# Patient Record
Sex: MALE | Race: BLACK OR AFRICAN AMERICAN | NOT HISPANIC OR LATINO | URBAN - METROPOLITAN AREA
[De-identification: names, ages, dates, MRNs, and addresses within clinical notes are randomized per-mention and may not be internally consistent; named-entity substitution may affect disease eponyms.]

---

## 2024-05-30 ENCOUNTER — OFFICE VISIT (OUTPATIENT)
Dept: PRIMARY CARE | Facility: CLINIC | Age: 59
End: 2024-05-30
Payer: COMMERCIAL

## 2024-05-30 ENCOUNTER — LAB (OUTPATIENT)
Dept: LAB | Facility: LAB | Age: 59
End: 2024-05-30
Payer: COMMERCIAL

## 2024-05-30 VITALS
OXYGEN SATURATION: 98 % | DIASTOLIC BLOOD PRESSURE: 90 MMHG | HEART RATE: 72 BPM | SYSTOLIC BLOOD PRESSURE: 140 MMHG | WEIGHT: 164 LBS | BODY MASS INDEX: 25.71 KG/M2

## 2024-05-30 DIAGNOSIS — Z11.59 NEED FOR HEPATITIS C SCREENING TEST: ICD-10-CM

## 2024-05-30 DIAGNOSIS — E78.5 HYPERLIPIDEMIA, UNSPECIFIED HYPERLIPIDEMIA TYPE: ICD-10-CM

## 2024-05-30 DIAGNOSIS — Z00.00 ANNUAL PHYSICAL EXAM: Primary | ICD-10-CM

## 2024-05-30 DIAGNOSIS — R03.0 ELEVATED BP WITHOUT DIAGNOSIS OF HYPERTENSION: ICD-10-CM

## 2024-05-30 DIAGNOSIS — Z12.5 SCREENING PSA (PROSTATE SPECIFIC ANTIGEN): ICD-10-CM

## 2024-05-30 DIAGNOSIS — Z12.11 ENCOUNTER FOR SCREENING FOR MALIGNANT NEOPLASM OF COLON: ICD-10-CM

## 2024-05-30 DIAGNOSIS — Z00.00 ANNUAL PHYSICAL EXAM: ICD-10-CM

## 2024-05-30 PROBLEM — G47.33 OBSTRUCTIVE SLEEP APNEA: Status: ACTIVE | Noted: 2024-05-30

## 2024-05-30 PROBLEM — M48.02 SPINAL STENOSIS, CERVICAL REGION: Status: ACTIVE | Noted: 2024-05-30

## 2024-05-30 LAB
ALBUMIN SERPL BCP-MCNC: 4.2 G/DL (ref 3.4–5)
ALP SERPL-CCNC: 51 U/L (ref 33–120)
ALT SERPL W P-5'-P-CCNC: 16 U/L (ref 10–52)
ANION GAP SERPL CALC-SCNC: 11 MMOL/L (ref 10–20)
AST SERPL W P-5'-P-CCNC: 21 U/L (ref 9–39)
BILIRUB SERPL-MCNC: 0.7 MG/DL (ref 0–1.2)
BUN SERPL-MCNC: 15 MG/DL (ref 6–23)
CALCIUM SERPL-MCNC: 9.3 MG/DL (ref 8.6–10.6)
CHLORIDE SERPL-SCNC: 106 MMOL/L (ref 98–107)
CHOLEST SERPL-MCNC: 145 MG/DL (ref 0–199)
CHOLESTEROL/HDL RATIO: 3.5
CO2 SERPL-SCNC: 31 MMOL/L (ref 21–32)
CREAT SERPL-MCNC: 1.08 MG/DL (ref 0.5–1.3)
EGFRCR SERPLBLD CKD-EPI 2021: 79 ML/MIN/1.73M*2
ERYTHROCYTE [DISTWIDTH] IN BLOOD BY AUTOMATED COUNT: 11.8 % (ref 11.5–14.5)
EST. AVERAGE GLUCOSE BLD GHB EST-MCNC: 80 MG/DL
GLUCOSE SERPL-MCNC: 89 MG/DL (ref 74–99)
HBA1C MFR BLD: 4.4 %
HCT VFR BLD AUTO: 43.3 % (ref 41–52)
HCV AB SER QL: NONREACTIVE
HDLC SERPL-MCNC: 41.6 MG/DL
HGB BLD-MCNC: 13.6 G/DL (ref 13.5–17.5)
LDLC SERPL CALC-MCNC: 90 MG/DL
MCH RBC QN AUTO: 28 PG (ref 26–34)
MCHC RBC AUTO-ENTMCNC: 31.4 G/DL (ref 32–36)
MCV RBC AUTO: 89 FL (ref 80–100)
NON HDL CHOLESTEROL: 103 MG/DL (ref 0–149)
NRBC BLD-RTO: 0 /100 WBCS (ref 0–0)
PLATELET # BLD AUTO: 327 X10*3/UL (ref 150–450)
POTASSIUM SERPL-SCNC: 4.9 MMOL/L (ref 3.5–5.3)
PROT SERPL-MCNC: 7.2 G/DL (ref 6.4–8.2)
PSA SERPL-MCNC: 2.51 NG/ML
RBC # BLD AUTO: 4.86 X10*6/UL (ref 4.5–5.9)
SODIUM SERPL-SCNC: 143 MMOL/L (ref 136–145)
TRIGL SERPL-MCNC: 66 MG/DL (ref 0–149)
TSH SERPL-ACNC: 2.12 MIU/L (ref 0.44–3.98)
VLDL: 13 MG/DL (ref 0–40)
WBC # BLD AUTO: 3.9 X10*3/UL (ref 4.4–11.3)

## 2024-05-30 PROCEDURE — 82570 ASSAY OF URINE CREATININE: CPT

## 2024-05-30 PROCEDURE — 84153 ASSAY OF PSA TOTAL: CPT

## 2024-05-30 PROCEDURE — 84443 ASSAY THYROID STIM HORMONE: CPT

## 2024-05-30 PROCEDURE — 80053 COMPREHEN METABOLIC PANEL: CPT

## 2024-05-30 PROCEDURE — 1036F TOBACCO NON-USER: CPT | Performed by: INTERNAL MEDICINE

## 2024-05-30 PROCEDURE — 36415 COLL VENOUS BLD VENIPUNCTURE: CPT

## 2024-05-30 PROCEDURE — 86803 HEPATITIS C AB TEST: CPT

## 2024-05-30 PROCEDURE — 83036 HEMOGLOBIN GLYCOSYLATED A1C: CPT

## 2024-05-30 PROCEDURE — 85027 COMPLETE CBC AUTOMATED: CPT

## 2024-05-30 PROCEDURE — 82043 UR ALBUMIN QUANTITATIVE: CPT

## 2024-05-30 PROCEDURE — 80061 LIPID PANEL: CPT

## 2024-05-30 PROCEDURE — 99386 PREV VISIT NEW AGE 40-64: CPT | Performed by: INTERNAL MEDICINE

## 2024-05-30 ASSESSMENT — PATIENT HEALTH QUESTIONNAIRE - PHQ9
1. LITTLE INTEREST OR PLEASURE IN DOING THINGS: NOT AT ALL
SUM OF ALL RESPONSES TO PHQ9 QUESTIONS 1 AND 2: 0
2. FEELING DOWN, DEPRESSED OR HOPELESS: NOT AT ALL

## 2024-05-30 NOTE — PROGRESS NOTES
Subjective   Patient ID: Kartik Gilliland III is a 58 y.o. male who presents for Annual Exam (Re-establishing care ).    HPI   The patient is 58 year old man without significant PMH who presents to re-establish medical care and for an annual wellness exam.    Review of Systems  Negative    Objective   /90   Pulse 72   Wt 74.4 kg (164 lb)   SpO2 98%   BMI 25.71 kg/m²     Physical Exam  NAD. Cooperative.  HEENT: WNL  Neck: WNL  Lungs CTA  Heart: RRR  Abdomen: WNL  Musculoskeletal system: WNL  Neurologic exam: WNL    Assessment/Plan   Diagnoses and all orders for this visit:  Annual physical exam  -     Hemoglobin A1C; Future  -     TSH; Future  -     Comprehensive metabolic panel; Future  -     CBC; Future  Encounter for screening for malignant neoplasm of colon  -     Colonoscopy Screening; Average Risk Patient; Future  Screening PSA (prostate specific antigen)  -     Prostate Spec.Ag,Screen; Future  Need for hepatitis C screening test  -     Hepatitis C Antibody; Future  Hyperlipidemia, unspecified hyperlipidemia type  -     Lipid panel; Future  -     CT cardiac scoring wo IV contrast; Future  Elevated BP without diagnosis of hypertension  -     Albumin, urine, random; Future  BP monitoring in 3 months

## 2024-05-31 LAB
CREAT UR-MCNC: 227.1 MG/DL (ref 20–370)
MICROALBUMIN UR-MCNC: 7.4 MG/L
MICROALBUMIN/CREAT UR: 3.3 UG/MG CREAT

## 2024-06-08 ENCOUNTER — HOSPITAL ENCOUNTER (OUTPATIENT)
Dept: RADIOLOGY | Facility: HOSPITAL | Age: 59
Discharge: HOME | End: 2024-06-08
Payer: COMMERCIAL

## 2024-06-08 DIAGNOSIS — E78.5 HYPERLIPIDEMIA, UNSPECIFIED HYPERLIPIDEMIA TYPE: ICD-10-CM

## 2024-06-08 PROCEDURE — 75571 CT HRT W/O DYE W/CA TEST: CPT

## 2024-08-30 ENCOUNTER — APPOINTMENT (OUTPATIENT)
Dept: PRIMARY CARE | Facility: CLINIC | Age: 59
End: 2024-08-30
Payer: COMMERCIAL

## 2024-09-03 ENCOUNTER — APPOINTMENT (OUTPATIENT)
Dept: PRIMARY CARE | Facility: CLINIC | Age: 59
End: 2024-09-03
Payer: COMMERCIAL

## 2024-10-01 ENCOUNTER — APPOINTMENT (OUTPATIENT)
Dept: PRIMARY CARE | Facility: CLINIC | Age: 59
End: 2024-10-01
Payer: COMMERCIAL

## 2024-10-01 VITALS
HEART RATE: 70 BPM | BODY MASS INDEX: 25.71 KG/M2 | SYSTOLIC BLOOD PRESSURE: 160 MMHG | WEIGHT: 164 LBS | DIASTOLIC BLOOD PRESSURE: 90 MMHG | OXYGEN SATURATION: 98 %

## 2024-10-01 DIAGNOSIS — B07.9 VIRAL WARTS, UNSPECIFIED TYPE: ICD-10-CM

## 2024-10-01 DIAGNOSIS — G47.30 SLEEP APNEA, UNSPECIFIED TYPE: ICD-10-CM

## 2024-10-01 DIAGNOSIS — I10 HYPERTENSION, UNSPECIFIED TYPE: ICD-10-CM

## 2024-10-01 DIAGNOSIS — R35.1 NOCTURIA: Primary | ICD-10-CM

## 2024-10-01 PROCEDURE — 1036F TOBACCO NON-USER: CPT | Performed by: INTERNAL MEDICINE

## 2024-10-01 PROCEDURE — 99214 OFFICE O/P EST MOD 30 MIN: CPT | Performed by: INTERNAL MEDICINE

## 2024-10-01 PROCEDURE — 3080F DIAST BP >= 90 MM HG: CPT | Performed by: INTERNAL MEDICINE

## 2024-10-01 PROCEDURE — 3077F SYST BP >= 140 MM HG: CPT | Performed by: INTERNAL MEDICINE

## 2024-10-01 RX ORDER — AMLODIPINE BESYLATE 2.5 MG/1
2.5 TABLET ORAL DAILY
Qty: 30 TABLET | Refills: 3 | Status: SHIPPED | OUTPATIENT
Start: 2024-10-01

## 2024-10-01 NOTE — PROGRESS NOTES
"Subjective   Patient ID: Kartik Gilliland III is a 59 y.o. male who presents for Follow-up (Patient here for follow-up visit ).    HPI   The patient presents to the office for blood pressure monitoring.   C/O frequent nocturnal urination. Admits to KEVON history, not using CPAP. \"It did not fit well\".  C/O skin lesion on the foot.    Review of Systems  Skin lesion  Sleep apnea  Nocturnal urination     Objective   /90   Pulse 70   Wt 74.4 kg (164 lb)   SpO2 98%   BMI 25.71 kg/m²     Physical Exam  NAD. Cooperative.  Skin: soleal aspect of the right foot lesion, rough surface, oval in shape  Lungs CTA  Heart: RRR  LE: negative     Assessment/Plan   Diagnoses and all orders for this visit:  Nocturia  -     Referral to Urology; Future  Sleep apnea, unspecified type  -     Referral to Adult Sleep Medicine; Future  Viral warts, unspecified type  -     Referral to Dermatology  Hypertension, unspecified type  -     amLODIPine (Norvasc) 2.5 mg tablet; Take 1 tablet (2.5 mg) by mouth once daily.    BP goals discussed, 3 months BP monitoring in the office after the initiation of Amlodipine.  Risks and benefits of the prescribed medication were discussed.        "

## 2024-10-07 ENCOUNTER — APPOINTMENT (OUTPATIENT)
Dept: SLEEP MEDICINE | Facility: CLINIC | Age: 59
End: 2024-10-07
Payer: COMMERCIAL

## 2024-10-07 VITALS
HEIGHT: 67 IN | WEIGHT: 162.8 LBS | SYSTOLIC BLOOD PRESSURE: 184 MMHG | TEMPERATURE: 98 F | BODY MASS INDEX: 25.55 KG/M2 | DIASTOLIC BLOOD PRESSURE: 90 MMHG

## 2024-10-07 DIAGNOSIS — G47.33 OBSTRUCTIVE SLEEP APNEA: Primary | ICD-10-CM

## 2024-10-07 DIAGNOSIS — G47.30 SLEEP APNEA, UNSPECIFIED TYPE: ICD-10-CM

## 2024-10-07 DIAGNOSIS — I10 HYPERTENSION, UNSPECIFIED TYPE: ICD-10-CM

## 2024-10-07 DIAGNOSIS — G47.00 INSOMNIA, UNSPECIFIED TYPE: ICD-10-CM

## 2024-10-07 PROCEDURE — 3008F BODY MASS INDEX DOCD: CPT | Performed by: STUDENT IN AN ORGANIZED HEALTH CARE EDUCATION/TRAINING PROGRAM

## 2024-10-07 PROCEDURE — 3077F SYST BP >= 140 MM HG: CPT | Performed by: STUDENT IN AN ORGANIZED HEALTH CARE EDUCATION/TRAINING PROGRAM

## 2024-10-07 PROCEDURE — 1036F TOBACCO NON-USER: CPT | Performed by: STUDENT IN AN ORGANIZED HEALTH CARE EDUCATION/TRAINING PROGRAM

## 2024-10-07 PROCEDURE — 99244 OFF/OP CNSLTJ NEW/EST MOD 40: CPT | Performed by: STUDENT IN AN ORGANIZED HEALTH CARE EDUCATION/TRAINING PROGRAM

## 2024-10-07 PROCEDURE — 3080F DIAST BP >= 90 MM HG: CPT | Performed by: STUDENT IN AN ORGANIZED HEALTH CARE EDUCATION/TRAINING PROGRAM

## 2024-10-07 ASSESSMENT — SLEEP AND FATIGUE QUESTIONNAIRES
HOW LIKELY ARE YOU TO NOD OFF OR FALL ASLEEP WHILE SITTING QUIETLY AFTER LUNCH WITHOUT ALCOHOL: WOULD NEVER DOZE
HOW LIKELY ARE YOU TO NOD OFF OR FALL ASLEEP WHILE SITTING AND TALKING TO SOMEONE: WOULD NEVER DOZE
HOW LIKELY ARE YOU TO NOD OFF OR FALL ASLEEP WHILE WATCHING TV: WOULD NEVER DOZE
HOW LIKELY ARE YOU TO NOD OFF OR FALL ASLEEP WHILE SITTING AND READING: WOULD NEVER DOZE
SATISFACTION_WITH_CURRENT_SLEEP_PATTERN: VERY SATISFIED
HOW LIKELY ARE YOU TO NOD OFF OR FALL ASLEEP IN A CAR, WHILE STOPPED FOR A FEW MINUTES IN TRAFFIC: WOULD NEVER DOZE
SITING INACTIVE IN A PUBLIC PLACE LIKE A CLASS ROOM OR A MOVIE THEATER: WOULD NEVER DOZE
ESS-CHAD TOTAL SCORE: 2
SLEEP_PROBLEM_NOTICEABLE_TO_OTHERS: A LITTLE
HOW LIKELY ARE YOU TO NOD OFF OR FALL ASLEEP WHEN YOU ARE A PASSENGER IN A CAR FOR AN HOUR WITHOUT A BREAK: SLIGHT CHANCE OF DOZING
HOW LIKELY ARE YOU TO NOD OFF OR FALL ASLEEP WHILE LYING DOWN TO REST IN THE AFTERNOON WHEN CIRCUMSTANCES PERMIT: SLIGHT CHANCE OF DOZING
WORRIED_DISTRESSED_DUE_TO_SLEEP: NOT AT ALL NOTICEABLE
SLEEP_PROBLEM_INTERFERES_DAILY_ACTIVITIES: NOT AT ALL NOTICEABLE

## 2024-10-07 ASSESSMENT — ENCOUNTER SYMPTOMS
PSYCHIATRIC NEGATIVE: 1
CONSTITUTIONAL NEGATIVE: 1
CARDIOVASCULAR NEGATIVE: 1
RESPIRATORY NEGATIVE: 1
NEUROLOGICAL NEGATIVE: 1

## 2024-10-07 NOTE — PROGRESS NOTES
Patient: Kartik Gilliland III    [id number]  : 1965 -- AGE 59 y.o.    Provider: Emmanuel Meek MD     Location [facility]   Service Date: 10/7/2024              [facility]  New Visit Note    ASSESSMENT/PLAN     Mr. Gilliland is a 59 y.o. male and He was referred to the [facility] for evaluation of problems listed below on 10/7/2024      Problem List, Orders, Assessment, Recommendations:  Problem List Items Addressed This Visit             ICD-10-CM    Obstructive sleep apnea - Primary G47.33     - we will start work-up with In-center sleep testing (PSG) to evaluate current disease burden  - lengthy discussion on KEVON and sleep study education as well as the tips to be successful with the sleep study  - patient voiced understanding  - once diagnosis was obtain, we will go ahead and order a new CPAP for him           Hypertension I10     BP Readings from Last 1 Encounters:   10/07/24 (!) 184/90     - BP elevated today, denies headache, denies change of vision.  - patient was just started on Amlodipine 2.5 mg daily but have not started taking it.  He will start today  - discussed at length the impact of untreated KEVON and BP control  - continue current management and follow-up with PCP           Insomnia G47.00     Mostly maintenance issue  We will treat KEVON          Other Visit Diagnoses         Codes    Sleep apnea, unspecified type     G47.30    Relevant Orders    In-Center Sleep Study            Disposition    Will call with sleep study results and determine F/U plan         HISTORY OF PRESENT ILLNESS     The patient's referring provider is: Brandy Chan, *; Brandy Chan MD    HISTORY OF PRESENT ILLNESS   Kartik Gilliland III is a 59 y.o. male with h/o Hypertension and KEVON who presents to a [facility] for a sleep medicine evaluation with concerns of New Patient Visit (Sleep apnea).     Past  Sleep History  Patient has the following sleep-related diagnoses: obstructive sleep apnea. Prior sleep study results: significant for KEVON (severity unknown)    Current History    On today's visit, the patient reports that he has been using CPAP for about 6 years now.  His current CPAP machine is broken about 1 year ago.  He wants to get back on using his PAP therapy.  He used to have a FFM, but wants something smaller this time around.  He still presents with classic OSAS symptoms such as snoring and nocturnal awakenings.  His BP is elevated today but he has no symptoms.  He was just started on amlodipine but have not started taking it    Sleep schedule:      Weekdays / Work Days Weekends / Days Off   Bedtime 10 pm  11:30 PM   Sleep latency 5 min same as weekdays   Wake time 6:45 am  8 AM   Total sleep time  Average/day:  Total sleep time 8 hours/day Same as weekdays   Naps No   Nocturnal awakenings Yes, about 3-4 times a night     Preferred sleeping position: SLEEP POSITION: sidelying    Sleep-related ROS:    Sleep Initiation: no problems going to sleep  Problems going to sleep associated with: No problems going to sleep    Sleep Maintenance: wakes up about 3-4 times per night and easily returns to sleep after awakening  Problems staying asleep associated with: Problems Staying Asleep: nocturia and breathing problems    Breathing during sleep: snoring    RLS screen:  RLSSCREEN: - Sensations: Patient does not have unusual sensations in their extremities that cause an urge to move them     Daytime Symptoms  On awakening patient reports: wake unrefreshed and morning dry mouth    Patient reports DAYTIME SYMPTOMS: no daytime symptoms  Patient denies daytime symptoms including: Denies: feeling sleepy when driving  Fatigue: denies feeling fatigue    ESS: 2  RADHA: 1  FOSQ: 40      REVIEW OF SYSTEMS     REVIEW OF SYSTEMS  Review of Systems   Constitutional: Negative.    HENT: Negative.     Respiratory: Negative.    "  Cardiovascular: Negative.    Genitourinary: Negative.    Skin: Negative.    Neurological: Negative.    Psychiatric/Behavioral: Negative.       SLEEP ROS: snoring      ALLERGIES AND MEDICATIONS     ALLERGIES  No Known Allergies    MEDICATIONS  Current Outpatient Medications   Medication Sig Dispense Refill    amLODIPine (Norvasc) 2.5 mg tablet Take 1 tablet (2.5 mg) by mouth once daily. (Patient not taking: Reported on 10/7/2024) 30 tablet 3     No current facility-administered medications for this visit.         PAST HISTORY     PAST MEDICAL HISTORY  He  has a past medical history of Encounter for immunization (12/04/2018), Encounter for immunization (12/04/2018), Other conditions influencing health status (07/27/2018), and Pain in right hip (07/27/2018).    PAST SURGICAL HISTORY:  History reviewed. No pertinent surgical history.    FAMILY HISTORY  No family history on file.    He does not have a family history of sleep disorder.    SOCIAL HISTORY  He  reports that he has never smoked. He has never used smokeless tobacco. He reports that he does not drink alcohol and does not use drugs. He currently lives alone.     Caffeine consumption: No  Alcohol consumption: No  Smoking: No  Marijuana: No      PHYSICAL EXAM     VITAL SIGNS: BP (!) 184/90 (BP Location: Right arm, Patient Position: Sitting, BP Cuff Size: Adult)   Temp 36.7 °C (98 °F) (Temporal)   Ht 1.702 m (5' 7\")   Wt 73.8 kg (162 lb 12.8 oz)   BMI 25.50 kg/m²      CURRENT WEIGHT:   Vitals:    10/07/24 1343   Weight: 73.8 kg (162 lb 12.8 oz)     Body mass index is 25.5 kg/m².  PREVIOUS WEIGHTS:  Wt Readings from Last 3 Encounters:   10/07/24 73.8 kg (162 lb 12.8 oz)   10/01/24 74.4 kg (164 lb)   05/30/24 74.4 kg (164 lb)       Physical Exam  Vitals reviewed.   Constitutional:       General: He is not in acute distress.     Appearance: Normal appearance. He is well-developed and normal weight.   HENT:      Head: Normocephalic and atraumatic.      Nose: Nose " normal. No congestion or rhinorrhea.      Mouth/Throat:      Mouth: Mucous membranes are moist.      Pharynx: Oropharynx is clear. No oropharyngeal exudate.   Eyes:      General: No scleral icterus.     Extraocular Movements: Extraocular movements intact.      Conjunctiva/sclera: Conjunctivae normal.      Pupils: Pupils are equal, round, and reactive to light.   Neck:      Thyroid: No thyroid mass or thyroid tenderness.      Vascular: No JVD.   Cardiovascular:      Rate and Rhythm: Normal rate.      Pulses: Normal pulses.   Pulmonary:      Effort: Pulmonary effort is normal. No respiratory distress.   Musculoskeletal:      Cervical back: Normal range of motion and neck supple. No rigidity or tenderness.   Lymphadenopathy:      Cervical: No cervical adenopathy.   Neurological:      Mental Status: He is alert and oriented to person, place, and time.   Psychiatric:         Mood and Affect: Mood normal.         Behavior: Behavior normal.         Thought Content: Thought content normal.       PHYSICAL EXAM: MODIFIED MALLAMPATI SCORE: III (soft and hard palate and base of uvula visible)  TONGUE SCALLOPING: with scalloping      RESULTS/DATA     Bicarbonate (mmol/L)   Date Value   05/30/2024 31   12/02/2018 30

## 2024-10-07 NOTE — ASSESSMENT & PLAN NOTE
BP Readings from Last 1 Encounters:   10/07/24 (!) 184/90     - BP elevated today, denies headache, denies change of vision.  - patient was just started on Amlodipine 2.5 mg daily but have not started taking it.  He will start today  - discussed at length the impact of untreated KEVON and BP control  - continue current management and follow-up with PCP

## 2024-10-07 NOTE — ASSESSMENT & PLAN NOTE
- we will start work-up with In-center sleep testing (PSG) to evaluate current disease burden  - lengthy discussion on KEVON and sleep study education as well as the tips to be successful with the sleep study  - patient voiced understanding  - once diagnosis was obtain, we will go ahead and order a new CPAP for him

## 2024-10-08 ENCOUNTER — APPOINTMENT (OUTPATIENT)
Dept: DERMATOLOGY | Facility: CLINIC | Age: 59
End: 2024-10-08
Payer: COMMERCIAL

## 2024-10-08 DIAGNOSIS — R52 PAIN: ICD-10-CM

## 2024-10-08 DIAGNOSIS — B07.8 OTHER VIRAL WARTS: Primary | ICD-10-CM

## 2024-10-08 PROCEDURE — 17110 DESTRUCTION B9 LES UP TO 14: CPT

## 2024-10-08 PROCEDURE — 99203 OFFICE O/P NEW LOW 30 MIN: CPT

## 2024-10-08 NOTE — PROGRESS NOTES
Nicol Gilliland III is a 59 y.o. male who presents for the following: Wart. Lesion on the left plantar foot, present for 4-5 years. Has been clipping with nail clippers at home. Reports pain.       Review of Systems:  No other skin or systemic complaints other than what is documented elsewhere in the note.    The following portions of the chart were reviewed this encounter and updated as appropriate:          Skin Cancer History  No skin cancer on file.      Specialty Problems    None       Objective   Well appearing patient in no apparent distress; mood and affect are within normal limits.    A focused skin examination was performed. All findings within normal limits unless otherwise noted below.    Assessment/Plan   1. Other viral warts  Left Hallux Metatarsal Plantar Area  Verrucous plaque on left plantar foot.     -Discussed nature of condition  -Multiple treatments in office are often needed  -Diligent at home therapy is often needed. Discussed starting Wart Stick at home, handout with instructions provided.   -Cryotherapy today  -Possible side effects of liquid nitrogen treatment reviewed including formation of blisters, crusting, tenderness, scar, and discoloration which may be permanent.  -Patient advised to return the office for re-evaluation if the treated lesion(s) do not resolve within 4-6 weeks. Patient verbalizes understanding.    Destr of lesion - Left Hallux Metatarsal Plantar Area  Complexity: simple    Destruction method: cryotherapy    Informed consent: discussed and consent obtained    Lesion destroyed using liquid nitrogen: Yes    Cryotherapy cycles:  2  Outcome: patient tolerated procedure well with no complications    Post-procedure details: wound care instructions given        Follow up in 4 weeks.

## 2024-10-09 ENCOUNTER — TELEPHONE (OUTPATIENT)
Dept: PRIMARY CARE | Facility: CLINIC | Age: 59
End: 2024-10-09
Payer: COMMERCIAL

## 2024-10-09 DIAGNOSIS — M26.609 TMJ (TEMPOROMANDIBULAR JOINT DISORDER): Primary | ICD-10-CM

## 2024-10-11 ENCOUNTER — CLINICAL SUPPORT (OUTPATIENT)
Dept: SLEEP MEDICINE | Facility: CLINIC | Age: 59
End: 2024-10-11
Payer: COMMERCIAL

## 2024-10-11 DIAGNOSIS — G47.30 SLEEP APNEA, UNSPECIFIED TYPE: ICD-10-CM

## 2024-10-12 VITALS
BODY MASS INDEX: 25.54 KG/M2 | WEIGHT: 162.7 LBS | DIASTOLIC BLOOD PRESSURE: 90 MMHG | HEIGHT: 67 IN | SYSTOLIC BLOOD PRESSURE: 184 MMHG

## 2024-10-12 ASSESSMENT — SLEEP AND FATIGUE QUESTIONNAIRES
HOW LIKELY ARE YOU TO NOD OFF OR FALL ASLEEP WHILE SITTING AND READING: SLIGHT CHANCE OF DOZING
HOW LIKELY ARE YOU TO NOD OFF OR FALL ASLEEP WHILE SITTING AND TALKING TO SOMEONE: WOULD NEVER DOZE
HOW LIKELY ARE YOU TO NOD OFF OR FALL ASLEEP WHILE SITTING QUIETLY AFTER LUNCH WITHOUT ALCOHOL: WOULD NEVER DOZE
HOW LIKELY ARE YOU TO NOD OFF OR FALL ASLEEP WHILE LYING DOWN TO REST IN THE AFTERNOON WHEN CIRCUMSTANCES PERMIT: SLIGHT CHANCE OF DOZING
HOW LIKELY ARE YOU TO NOD OFF OR FALL ASLEEP WHILE WATCHING TV: SLIGHT CHANCE OF DOZING
ESS-CHAD TOTAL SCORE: 5
HOW LIKELY ARE YOU TO NOD OFF OR FALL ASLEEP IN A CAR, WHILE STOPPED FOR A FEW MINUTES IN TRAFFIC: WOULD NEVER DOZE
SITING INACTIVE IN A PUBLIC PLACE LIKE A CLASS ROOM OR A MOVIE THEATER: SLIGHT CHANCE OF DOZING
HOW LIKELY ARE YOU TO NOD OFF OR FALL ASLEEP WHEN YOU ARE A PASSENGER IN A CAR FOR AN HOUR WITHOUT A BREAK: SLIGHT CHANCE OF DOZING

## 2024-10-12 NOTE — PROGRESS NOTES
[facility] TECH NOTE:     Patient: Kartik Gilliland III   MRN//AGE: [id number]  1965  59 y.o.   Technologist: Pauline Guadalupe   Room: 3   Service Date: 10/12/2024        [facility]     TECHNOLOGIST SLEEP STUDY PROCEDURE NOTE:   This sleep study is being conducted according to the policies and procedures outlined by the AAS accreditation standards.  The sleep study procedure and processes involved during this appointment was explained to the patient/patient’s family, questions were answered. The patient/family verbalized understanding.      The patient is a 59 y.o. year old male scheduled for a Split Night Study  with montage of:  standard . he arrived for his appointment.      The study that was ultimately completed was a diagnostic PSG  with montage of:  standard .    The full study Was completed.  Patient questionnaires completed?: yes     Consents signed? yes    Initial Fall Risk Screening:     Kartik has not fallen in the last 6 months. his did not result in injury. Kartik does not have a fear of falling. He does not need assistance with sitting, standing, or walking. he does not need assistance walking in his home. he does not need assistance in an unfamiliar setting. The patient is notusing an assistive device.     Brief Study observations: This patient presents as a Split Night study, AHI=10 and he did not meet this criteria. The patient arrived at 8:00 pm by himself. The sleep study procedure was  explained to the patient and questions were answered. The patient verbalized understanding. The patient seemed to demonstrate most respiratory events while in REM supine sleep. Please note, there were some technical issues regarding the camera ( patient video) freezing and glitching. Due to the camera issues some video may not have recorded. Also, due to the camera issues some body positions may not have been correctly documented as the camera was freezing. Supine sleep may have been  underestimated. REM supine sleep was captured.      Deviation to order/protocol and reason: Did not meet split criteria     If PAP, which was preferred mask/pressure/mode: None      Other:None    After the procedure, the patient/family was informed to ensure followup with ordering clinician for testing results.      Technologist: Pauline Guadalupe

## 2024-10-18 ENCOUNTER — APPOINTMENT (OUTPATIENT)
Dept: URGENT CARE | Age: 59
End: 2024-10-18
Payer: COMMERCIAL

## 2024-10-22 ENCOUNTER — TELEPHONE (OUTPATIENT)
Dept: SLEEP MEDICINE | Facility: HOSPITAL | Age: 59
End: 2024-10-22
Payer: COMMERCIAL

## 2024-10-22 DIAGNOSIS — G47.33 OSA (OBSTRUCTIVE SLEEP APNEA): ICD-10-CM

## 2024-10-22 NOTE — TELEPHONE ENCOUNTER
----- Message from Emmanuel Meek sent at 10/19/2024  9:54 AM EDT -----  Shaquille Espino,    Let's start this patient on PAP    Thank you      Emmanuel

## 2024-10-22 NOTE — TELEPHONE ENCOUNTER
Called patient regarding the sleep study result. Patient agreeable to start APAP therapy with [facility]. Patient scheduled for a follow-up appointment with Dr Meek between 30-90 days after CPAP initiation on 1/8/2024. Patient verbalized understanding, all questions answered.

## 2024-10-29 ENCOUNTER — OFFICE VISIT (OUTPATIENT)
Dept: UROLOGY | Facility: HOSPITAL | Age: 59
End: 2024-10-29
Payer: COMMERCIAL

## 2024-10-29 DIAGNOSIS — R35.1 NOCTURIA: Primary | ICD-10-CM

## 2024-10-29 PROCEDURE — 99204 OFFICE O/P NEW MOD 45 MIN: CPT | Performed by: STUDENT IN AN ORGANIZED HEALTH CARE EDUCATION/TRAINING PROGRAM

## 2024-10-29 PROCEDURE — 99214 OFFICE O/P EST MOD 30 MIN: CPT | Performed by: STUDENT IN AN ORGANIZED HEALTH CARE EDUCATION/TRAINING PROGRAM

## 2024-10-29 RX ORDER — TAMSULOSIN HYDROCHLORIDE 0.4 MG/1
0.4 CAPSULE ORAL DAILY
Qty: 30 CAPSULE | Refills: 11 | Status: SHIPPED | OUTPATIENT
Start: 2024-10-29 | End: 2025-10-29

## 2024-11-05 ENCOUNTER — APPOINTMENT (OUTPATIENT)
Dept: DERMATOLOGY | Facility: CLINIC | Age: 59
End: 2024-11-05
Payer: COMMERCIAL

## 2024-11-05 DIAGNOSIS — B07.9 VIRAL WARTS, UNSPECIFIED TYPE: Primary | ICD-10-CM

## 2024-11-05 PROCEDURE — 99212 OFFICE O/P EST SF 10 MIN: CPT

## 2024-11-05 NOTE — PROGRESS NOTES
Subjective     Kartik Gilliland III is a 59 y.o. male who presents for the following: Wart.   LN2 done at last visit. He has been using Wart Stick at home. Reports improvement.     Review of Systems:  No other skin or systemic complaints other than what is documented elsewhere in the note.    The following portions of the chart were reviewed this encounter and updated as appropriate:          Skin Cancer History  No skin cancer on file.      Specialty Problems    None       Objective   Well appearing patient in no apparent distress; mood and affect are within normal limits.    A focused skin examination was performed. All findings within normal limits unless otherwise noted below.    Assessment/Plan   1. Viral warts, unspecified type  Left Hallux Metatarsal Plantar Area  Verrucous papule with thrombosed capillaries.     - Much improved. Patient reports the area is less painful than it has been. Pain is currently a 1-2/10 on the pain scale.   -We discussed doing cryotherapy today vs continuing with Wart Stick at home. Patient wishes to continue with Wart Stick and reevaluate in 4-6 weeks.         Follow up in 4-6 weeks.

## 2024-11-07 ENCOUNTER — HOSPITAL ENCOUNTER (OUTPATIENT)
Dept: RADIOLOGY | Facility: HOSPITAL | Age: 59
End: 2024-11-07
Payer: COMMERCIAL

## 2024-12-03 ENCOUNTER — APPOINTMENT (OUTPATIENT)
Dept: DERMATOLOGY | Facility: CLINIC | Age: 59
End: 2024-12-03
Payer: COMMERCIAL

## 2024-12-05 ENCOUNTER — HOSPITAL ENCOUNTER (OUTPATIENT)
Dept: RADIOLOGY | Facility: HOSPITAL | Age: 59
Discharge: HOME | End: 2024-12-05
Payer: COMMERCIAL

## 2024-12-05 DIAGNOSIS — R35.1 NOCTURIA: ICD-10-CM

## 2024-12-05 PROCEDURE — 76770 US EXAM ABDO BACK WALL COMP: CPT

## 2024-12-05 PROCEDURE — 76770 US EXAM ABDO BACK WALL COMP: CPT | Performed by: RADIOLOGY

## 2025-01-06 ENCOUNTER — APPOINTMENT (OUTPATIENT)
Dept: PRIMARY CARE | Facility: CLINIC | Age: 60
End: 2025-01-06
Payer: COMMERCIAL

## 2025-01-06 VITALS
OXYGEN SATURATION: 98 % | SYSTOLIC BLOOD PRESSURE: 150 MMHG | DIASTOLIC BLOOD PRESSURE: 90 MMHG | BODY MASS INDEX: 25.84 KG/M2 | WEIGHT: 165 LBS | HEART RATE: 89 BPM

## 2025-01-06 DIAGNOSIS — I10 HYPERTENSION, UNSPECIFIED TYPE: Primary | ICD-10-CM

## 2025-01-06 PROCEDURE — 1036F TOBACCO NON-USER: CPT | Performed by: INTERNAL MEDICINE

## 2025-01-06 PROCEDURE — 3077F SYST BP >= 140 MM HG: CPT | Performed by: INTERNAL MEDICINE

## 2025-01-06 PROCEDURE — 3080F DIAST BP >= 90 MM HG: CPT | Performed by: INTERNAL MEDICINE

## 2025-01-06 PROCEDURE — 99214 OFFICE O/P EST MOD 30 MIN: CPT | Performed by: INTERNAL MEDICINE

## 2025-01-06 RX ORDER — AMLODIPINE BESYLATE 5 MG/1
5 TABLET ORAL DAILY
Qty: 90 TABLET | Refills: 0 | Status: SHIPPED | OUTPATIENT
Start: 2025-01-06 | End: 2025-07-05

## 2025-01-06 NOTE — PROGRESS NOTES
Subjective   Patient ID: Kartik Gilliland III is a 59 y.o. male who presents for Follow-up (Patient here for follow-up visit).    HPI   The patient presents to the office for blood pressure monitoring, the current medication regimen is well tolerated.    Review of Systems  Negative   Objective   /90   Pulse 89   Wt 74.8 kg (165 lb)   SpO2 98%   BMI 25.84 kg/m²     Physical Exam  NAD. Cooperative.  Lungs CTA  Heart: RRR  LE: negative     Assessment/Plan   Diagnoses and all orders for this visit:  Hypertension, unspecified type  -     amLODIPine (Norvasc) 5 mg tablet; Take 1 tablet (5 mg) by mouth once daily.  BP control is improving, recommended to increase 5 mg daily, 3 months BP monitoring, BP goals were discussed.  Pneumococcal vaccine was discussed and recommended, the patient declined.

## 2025-01-08 ENCOUNTER — APPOINTMENT (OUTPATIENT)
Dept: SLEEP MEDICINE | Facility: CLINIC | Age: 60
End: 2025-01-08
Payer: COMMERCIAL

## 2025-01-08 VITALS
DIASTOLIC BLOOD PRESSURE: 91 MMHG | HEART RATE: 64 BPM | RESPIRATION RATE: 18 BRPM | TEMPERATURE: 98.5 F | WEIGHT: 166.4 LBS | HEIGHT: 67 IN | SYSTOLIC BLOOD PRESSURE: 159 MMHG | BODY MASS INDEX: 26.12 KG/M2 | OXYGEN SATURATION: 98 %

## 2025-01-08 DIAGNOSIS — G47.00 INSOMNIA, UNSPECIFIED TYPE: ICD-10-CM

## 2025-01-08 DIAGNOSIS — G47.33 OSA (OBSTRUCTIVE SLEEP APNEA): ICD-10-CM

## 2025-01-08 DIAGNOSIS — I10 HYPERTENSION, UNSPECIFIED TYPE: ICD-10-CM

## 2025-01-08 DIAGNOSIS — G47.33 OBSTRUCTIVE SLEEP APNEA: Primary | ICD-10-CM

## 2025-01-08 PROCEDURE — 3008F BODY MASS INDEX DOCD: CPT | Performed by: STUDENT IN AN ORGANIZED HEALTH CARE EDUCATION/TRAINING PROGRAM

## 2025-01-08 PROCEDURE — 99214 OFFICE O/P EST MOD 30 MIN: CPT | Performed by: STUDENT IN AN ORGANIZED HEALTH CARE EDUCATION/TRAINING PROGRAM

## 2025-01-08 PROCEDURE — 3080F DIAST BP >= 90 MM HG: CPT | Performed by: STUDENT IN AN ORGANIZED HEALTH CARE EDUCATION/TRAINING PROGRAM

## 2025-01-08 PROCEDURE — 1036F TOBACCO NON-USER: CPT | Performed by: STUDENT IN AN ORGANIZED HEALTH CARE EDUCATION/TRAINING PROGRAM

## 2025-01-08 PROCEDURE — 3077F SYST BP >= 140 MM HG: CPT | Performed by: STUDENT IN AN ORGANIZED HEALTH CARE EDUCATION/TRAINING PROGRAM

## 2025-01-08 ASSESSMENT — SLEEP AND FATIGUE QUESTIONNAIRES
HOW LIKELY ARE YOU TO NOD OFF OR FALL ASLEEP WHILE SITTING AND READING: WOULD NEVER DOZE
SATISFACTION_WITH_CURRENT_SLEEP_PATTERN: VERY SATISFIED
WAKING_TOO_EARLY: MILD
SLEEP_PROBLEM_NOTICEABLE_TO_OTHERS: A LITTLE
HOW LIKELY ARE YOU TO NOD OFF OR FALL ASLEEP IN A CAR, WHILE STOPPED FOR A FEW MINUTES IN TRAFFIC: WOULD NEVER DOZE
SLEEP_PROBLEM_INTERFERES_DAILY_ACTIVITIES: A LITTLE
HOW LIKELY ARE YOU TO NOD OFF OR FALL ASLEEP WHILE SITTING INACTIVE IN A PUBLIC PLACE: SLIGHT CHANCE OF DOZING
HOW LIKELY ARE YOU TO NOD OFF OR FALL ASLEEP WHILE SITTING QUIETLY AFTER LUNCH WITHOUT ALCOHOL: WOULD NEVER DOZE
ESS-CHAD TOTAL SCORE: 3
HOW LIKELY ARE YOU TO NOD OFF OR FALL ASLEEP WHEN YOU ARE A PASSENGER IN A CAR FOR AN HOUR WITHOUT A BREAK: MODERATE CHANCE OF DOZING
HOW LIKELY ARE YOU TO NOD OFF OR FALL ASLEEP WHILE WATCHING TV: WOULD NEVER DOZE
WORRIED_DISTRESSED_DUE_TO_SLEEP: NOT AT ALL NOTICEABLE
HOW LIKELY ARE YOU TO NOD OFF OR FALL ASLEEP WHILE SITTING AND TALKING TO SOMEONE: WOULD NEVER DOZE
HOW LIKELY ARE YOU TO NOD OFF OR FALL ASLEEP IN A CAR, WHILE STOPPED FOR A FEW MINUTES IN TRAFFIC: WOULD NEVER DOZE
HOW LIKELY ARE YOU TO NOD OFF OR FALL ASLEEP WHILE LYING DOWN TO REST IN THE AFTERNOON WHEN CIRCUMSTANCES PERMIT: SLIGHT CHANCE OF DOZING
DIFFICULTY_STAYING_ASLEEP: MILD
ESS TOTAL SCORE: 4
SITING INACTIVE IN A PUBLIC PLACE LIKE A CLASS ROOM OR A MOVIE THEATER: WOULD NEVER DOZE

## 2025-01-08 ASSESSMENT — ENCOUNTER SYMPTOMS
CONSTITUTIONAL NEGATIVE: 1
CARDIOVASCULAR NEGATIVE: 1
NEUROLOGICAL NEGATIVE: 1
RESPIRATORY NEGATIVE: 1
PSYCHIATRIC NEGATIVE: 1

## 2025-01-08 ASSESSMENT — PATIENT HEALTH QUESTIONNAIRE - PHQ9
2. FEELING DOWN, DEPRESSED OR HOPELESS: NOT AT ALL
1. LITTLE INTEREST OR PLEASURE IN DOING THINGS: NOT AT ALL
SUM OF ALL RESPONSES TO PHQ9 QUESTIONS 1 AND 2: 0

## 2025-01-08 ASSESSMENT — COLUMBIA-SUICIDE SEVERITY RATING SCALE - C-SSRS
6. HAVE YOU EVER DONE ANYTHING, STARTED TO DO ANYTHING, OR PREPARED TO DO ANYTHING TO END YOUR LIFE?: NO
2. HAVE YOU ACTUALLY HAD ANY THOUGHTS OF KILLING YOURSELF?: NO
1. IN THE PAST MONTH, HAVE YOU WISHED YOU WERE DEAD OR WISHED YOU COULD GO TO SLEEP AND NOT WAKE UP?: NO

## 2025-01-08 NOTE — ASSESSMENT & PLAN NOTE
Much better with PAP therapy but doesn't use it consistently enough.    Encouraged usage extension with PAP therapy.

## 2025-01-08 NOTE — PROGRESS NOTES
Patient: Kartik Gilliland III    [id number]  : 1965 -- AGE 59 y.o.    Provider: Emmanuel Meek MD     Location [facility]   Service Date: 2025              [facility]  Followup Visit Note        ASSESSMENT/PLAN     Mr. Gilliland is a 59 y.o. male and he returns in followup to the [facility] for the problems listed below on 25     Problem List, Orders, Assessment, Recommendations:  Problem List Items Addressed This Visit             ICD-10-CM    Obstructive sleep apnea - Primary G47.33     - doing well with PAP therapy  - continue current setting  - renew PAP supply orders           Hypertension I10     BP Readings from Last 1 Encounters:   25 (!) 159/91     - BP elevated today, denies headache, denies change of vision.  - discussed at length the impact of untreated KEVON and BP control  - continue current management and follow-up with PCP           Insomnia G47.00     Much better with PAP therapy but doesn't use it consistently enough.          Disposition  Return to clinic in 6 months       HISTORY OF PRESENT ILLNESS     HISTORY OF PRESENT ILLNESS   Kartik Gilliland III is a 59 y.o. male with h/o Hypertension and KEVON who presents to a [facility] for followup.     Assessment and plan from last visit: 10/7/2024    Mr. Gilliland is a 59 y.o. male and He was referred to the [facility] for evaluation of problems listed below on 10/7/2024       Problem List, Orders, Assessment, Recommendations:  Problem List Items Addressed This Visit               ICD-10-CM     Obstructive sleep apnea - Primary G47.33       - we will start work-up with In-center sleep testing (PSG) to evaluate current disease burden  - lengthy discussion on KEVON and sleep study education as well as the tips to be successful with the sleep study  - patient voiced understanding  - once diagnosis was  obtain, we will go ahead and order a new CPAP for him              Hypertension I10           BP Readings from Last 1 Encounters:   10/07/24 (!) 184/90      - BP elevated today, denies headache, denies change of vision.  - patient was just started on Amlodipine 2.5 mg daily but have not started taking it.  He will start today  - discussed at length the impact of untreated KEVON and BP control  - continue current management and follow-up with PCP              Insomnia G47.00       Mostly maintenance issue  We will treat KEVON          Current History    On today's visit, the patient reports that he has been doing well and have no issues with PAP therapy.  He finds it very helpful.  Unfortunately, he has not really established a good routine to consistently use his PAP therapy.  He is willing to try harder to make sure that he meets the compliance.    PAP Info  DURABLE MEDICAL [employer]QU[employer]MENT COMPANY: [employer]  Issues with therapy: ISSUES WITH THERAPY: None  Benefits with PAP: PERCEIVED BENEFITS OF PAP: refreshing sleep    PAP Adherence  A PAP adherence download was obtained and data was reviewed personally today in clinic.    RLS Followup:   none.    Daytime Symptoms    Patient reports DAYTIME SYMPTOMS: no daytime symptoms  Patient denies daytime symptoms including: Denies: excessive daytime sleepiness    Naps: No  Fatigue: denies feeling fatigue    ESS: 3  RADHA: 4  FOSQ: 32    REVIEW OF SYSTEMS     REVIEW OF SYSTEMS  Review of Systems   Constitutional: Negative.    HENT: Negative.     Respiratory: Negative.     Cardiovascular: Negative.    Genitourinary: Negative.    Skin: Negative.    Neurological: Negative.    Psychiatric/Behavioral: Negative.           ALLERGIES AND MEDICATIONS     ALLERGIES  No Known Allergies    MEDICATIONS: He has a current medication list which includes the following prescription(s): amlodipine - Take 1 tablet (5 mg) by mouth once daily and tamsulosin - Take 1 capsule (0.4 mg) by mouth  "once daily.    PAST MEDICAL HISTORY : He  has a past medical history of Encounter for immunization (12/04/2018), Encounter for immunization (12/04/2018), Other conditions influencing health status (07/27/2018), and Pain in right hip (07/27/2018).    PAST SURGICAL HISTORY: He  has no past surgical history on file.     FAMILY HISTORY: No changes since previous visit. Otherwise non-contributory as charted.     SOCIAL HISTORY  He  reports that he has never smoked. He has never used smokeless tobacco. He reports that he does not drink alcohol and does not use drugs.       PHYSICAL EXAM     VITAL SIGNS: BP (!) 159/91   Pulse 64   Temp 36.9 °C (98.5 °F)   Resp 18   Ht 1.702 m (5' 7\")   Wt 75.5 kg (166 lb 6.4 oz)   SpO2 98%   BMI 26.06 kg/m²      PREVIOUS WEIGHTS:  Wt Readings from Last 3 Encounters:   01/08/25 75.5 kg (166 lb 6.4 oz)   01/06/25 74.8 kg (165 lb)   10/12/24 73.8 kg (162 lb 11.2 oz)         RESULTS/DATA     Bicarbonate (mmol/L)   Date Value   05/30/2024 31   12/02/2018 30                 "

## 2025-01-08 NOTE — ASSESSMENT & PLAN NOTE
BP Readings from Last 1 Encounters:   01/08/25 (!) 159/91     - BP elevated today, denies headache, denies change of vision.  - discussed at length the impact of untreated KEVON and BP control  - continue current management and follow-up with PCP

## 2025-04-07 ENCOUNTER — APPOINTMENT (OUTPATIENT)
Dept: PRIMARY CARE | Facility: CLINIC | Age: 60
End: 2025-04-07
Payer: COMMERCIAL

## 2025-04-23 ENCOUNTER — APPOINTMENT (OUTPATIENT)
Dept: SLEEP MEDICINE | Facility: CLINIC | Age: 60
End: 2025-04-23
Payer: COMMERCIAL

## 2025-08-06 ENCOUNTER — APPOINTMENT (OUTPATIENT)
Dept: DERMATOLOGY | Facility: CLINIC | Age: 60
End: 2025-08-06
Payer: COMMERCIAL

## 2025-08-06 DIAGNOSIS — D48.5 NEOPLASM OF UNCERTAIN BEHAVIOR OF SKIN: Primary | ICD-10-CM

## 2025-08-06 PROCEDURE — 1036F TOBACCO NON-USER: CPT

## 2025-08-06 PROCEDURE — 99212 OFFICE O/P EST SF 10 MIN: CPT

## 2025-08-06 NOTE — PROGRESS NOTES
Subjective     Kartik Gilliland III is a 60 y.o. male who presents for the following: Wart.     Patient is here today for wart on the left foot. He was last seen in derm 11/5/2024. The lesion was treated with LN2 and Wart Stick in the past. Says the wart has come back. Reports pain at night when the sheets rub against it. Denies bleeding. No personal or family history of skin cancer.          Review of Systems:  No other skin or systemic complaints other than what is documented elsewhere in the note.    The following portions of the chart were reviewed this encounter and updated as appropriate:   Tobacco  Allergies  Meds  Problems  Med Hx  Surg Hx  Fam Hx         Skin Cancer History  Biopsy Log Book  No skin cancers from Specimen Tracking.    Additional History      Specialty Problems    None       Objective   Well appearing patient in no apparent distress; mood and affect are within normal limits.    A focused skin examination was performed. All findings within normal limits unless otherwise noted below.    Assessment/Plan   Skin Exam  1. NEOPLASM OF UNCERTAIN BEHAVIOR OF SKIN  Left Hallux Metatarsal Plantar Area  1 cm vascular pink plaque  Ddx includes verrucous vulgaris vs verrucous carcinoma vs pyogenic granuloma   - Given uncertainty of clinical diagnosis, a biopsy is recommended. The patient is attending a car show today and will be doing a lot of walking. He wishes to hold off on doing the biopsy today.   -Will plan to do biopsy at appointment on 8/12/2025  - The patient expressed understanding, is in agreement with this plan.

## 2025-08-06 NOTE — Clinical Note
Ddx includes verrucous vulgaris vs verrucous carcinoma vs pyogenic granuloma   - Given uncertainty of clinical diagnosis, a biopsy is recommended. The patient is attending a car show today and will be doing a lot of walking. He wishes to hold off on doing the biopsy today.   -Will plan to do biopsy at appointment on 8/12/2025  - The patient expressed understanding, is in agreement with this plan.

## 2025-08-12 ENCOUNTER — APPOINTMENT (OUTPATIENT)
Dept: DERMATOLOGY | Facility: CLINIC | Age: 60
End: 2025-08-12
Payer: COMMERCIAL

## 2025-08-12 DIAGNOSIS — D48.5 NEOPLASM OF UNCERTAIN BEHAVIOR OF SKIN: Primary | ICD-10-CM

## 2025-08-12 PROCEDURE — 11102 TANGNTL BX SKIN SINGLE LES: CPT

## 2025-08-12 PROCEDURE — 1036F TOBACCO NON-USER: CPT

## 2025-08-12 ASSESSMENT — DERMATOLOGY PATIENT ASSESSMENT
HAVE YOU HAD OR DO YOU HAVE A STAPH INFECTION: NO
DO YOU HAVE ANY NEW OR CHANGING LESIONS: YES
HAVE YOU HAD OR DO YOU HAVE VASCULAR DISEASE: NO
DO YOU USE A TANNING BED: NO
ARE YOU AN ORGAN TRANSPLANT RECIPIENT: NO
WHERE ARE THESE NEW OR CHANGING LESIONS LOCATED: BOTTOM OF LEFT FOOT

## 2025-08-12 ASSESSMENT — DERMATOLOGY QUALITY OF LIFE (QOL) ASSESSMENT
DATE THE QUALITY-OF-LIFE ASSESSMENT WAS COMPLETED: 67429
ARE THERE EXCLUSIONS OR EXCEPTIONS FOR THE QUALITY OF LIFE ASSESSMENT: NO
RATE HOW BOTHERED YOU ARE BY SYMPTOMS OF YOUR SKIN PROBLEM (EG, ITCHING, STINGING BURNING, HURTING OR SKIN IRRITATION): 0 - NEVER BOTHERED
RATE HOW EMOTIONALLY BOTHERED YOU ARE BY YOUR SKIN PROBLEM (FOR EXAMPLE, WORRY, EMBARRASSMENT, FRUSTRATION): 0 - NEVER BOTHERED
RATE HOW BOTHERED YOU ARE BY EFFECTS OF YOUR SKIN PROBLEMS ON YOUR ACTIVITIES (EG, GOING OUT, ACCOMPLISHING WHAT YOU WANT, WORK ACTIVITIES OR YOUR RELATIONSHIPS WITH OTHERS): 0 - NEVER BOTHERED

## 2025-08-12 ASSESSMENT — PATIENT GLOBAL ASSESSMENT (PGA): PATIENT GLOBAL ASSESSMENT: PATIENT GLOBAL ASSESSMENT:  3 - MODERATE

## 2025-08-12 ASSESSMENT — ITCH NUMERIC RATING SCALE: HOW SEVERE IS YOUR ITCHING?: 0

## 2025-08-14 LAB
LABORATORY COMMENT REPORT: NORMAL
PATH REPORT.FINAL DX SPEC: NORMAL
PATH REPORT.GROSS SPEC: NORMAL
PATH REPORT.MICROSCOPIC SPEC OTHER STN: NORMAL
PATH REPORT.RELEVANT HX SPEC: NORMAL
PATH REPORT.TOTAL CANCER: NORMAL